# Patient Record
Sex: MALE | Race: WHITE | NOT HISPANIC OR LATINO | URBAN - METROPOLITAN AREA
[De-identification: names, ages, dates, MRNs, and addresses within clinical notes are randomized per-mention and may not be internally consistent; named-entity substitution may affect disease eponyms.]

---

## 2018-07-31 ENCOUNTER — EMERGENCY (EMERGENCY)
Facility: HOSPITAL | Age: 73
LOS: 1 days | Discharge: HOME | End: 2018-07-31
Admitting: EMERGENCY MEDICINE
Payer: MEDICARE

## 2018-07-31 VITALS
HEART RATE: 57 BPM | RESPIRATION RATE: 18 BRPM | DIASTOLIC BLOOD PRESSURE: 83 MMHG | OXYGEN SATURATION: 100 % | WEIGHT: 199.96 LBS | TEMPERATURE: 98 F | SYSTOLIC BLOOD PRESSURE: 149 MMHG
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
## 2018-07-31 DIAGNOSIS — Z23 ENCOUNTER FOR IMMUNIZATION: ICD-10-CM

## 2018-07-31 DIAGNOSIS — S91.321A LACERATION WITH FOREIGN BODY, RIGHT FOOT, INITIAL ENCOUNTER: ICD-10-CM

## 2018-07-31 DIAGNOSIS — I10 ESSENTIAL (PRIMARY) HYPERTENSION: ICD-10-CM
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
## 2018-07-31 PROCEDURE — 73630 X-RAY EXAM OF FOOT: CPT

## 2018-07-31 PROCEDURE — 99283 EMERGENCY DEPT VISIT LOW MDM: CPT | Mod: 25

## 2018-07-31 PROCEDURE — 12001 RPR S/N/AX/GEN/TRNK 2.5CM/<: CPT

## 2018-07-31 PROCEDURE — 73630 X-RAY EXAM OF FOOT: CPT | Mod: 26,RT

## 2018-07-31 PROCEDURE — 90715 TDAP VACCINE 7 YRS/> IM: CPT

## 2018-07-31 PROCEDURE — 90471 IMMUNIZATION ADMIN: CPT

## 2018-07-31 RX ORDER — TETANUS TOXOID, REDUCED DIPHTHERIA TOXOID AND ACELLULAR PERTUSSIS VACCINE, ADSORBED 5; 2.5; 8; 8; 2.5 [IU]/.5ML; [IU]/.5ML; UG/.5ML; UG/.5ML; UG/.5ML
0.5 SUSPENSION INTRAMUSCULAR ONCE
Qty: 0 | Refills: 0 | Status: COMPLETED | OUTPATIENT
Start: 2018-07-31 | End: 2018-07-31

## 2018-07-31 RX ADMIN — TETANUS TOXOID, REDUCED DIPHTHERIA TOXOID AND ACELLULAR PERTUSSIS VACCINE, ADSORBED 0.5 MILLILITER(S): 5; 2.5; 8; 8; 2.5 SUSPENSION INTRAMUSCULAR at 12:51

## 2018-07-31 NOTE — ED ADULT NURSE NOTE - CHIEF COMPLAINT QUOTE
Patient complaining of laceration to right foot after glass vase fell ontop of it.  Bleeding controlled last tetanus x 10 years ago.

## 2018-07-31 NOTE — ED ADULT NURSE NOTE - OBJECTIVE STATEMENT
Pt presents to ED c/o R foot laceration s/p dropping a vase on top of it PTA, pt with 2 cm laceration. Pt denies pain, bleeding well controlled by EMS. Tetanus not UTD. Pt presents in NAD speaking full sentences via EMS stretcher through triage.

## 2018-07-31 NOTE — ED PROVIDER NOTE - PHYSICAL EXAMINATION
2 cm laceration over distal 5th metatarsal region. FROM to all toes. sensation intact. cap refill < 2secs. mild active bleeding. + 2 abrasions to lateral right foot. no active bleeding.

## 2018-07-31 NOTE — ED PROVIDER NOTE - MEDICAL DECISION MAKING DETAILS
laceration to foot. neurovascular intact. no evidence of tendon injury on exam. no fb visibile. x-ray no acute pathology. lac repaired. td updated. wound care d/w pt. suture removal in 7 days.

## 2018-07-31 NOTE — ED PROVIDER NOTE - OBJECTIVE STATEMENT
71 y/o male with hx of HTN c/o lac to right foot. pt states vase fell from 4 ft and broke on his foot. pt notes bleeding and pain to right foot. pt able to bear wt. no numbness or tinlging. pt does not recall last Td. no blood thinner use. no further complaints.

## 2018-07-31 NOTE — ED ADULT NURSE NOTE - INTERVENTIONS DEFINITIONS
Non-slip footwear when patient is off stretcher/Provide visual cue, wrist band, yellow gown, etc./Stretcher in lowest position, wheels locked, appropriate side rails in place

## 2018-08-07 ENCOUNTER — EMERGENCY (EMERGENCY)
Facility: HOSPITAL | Age: 73
LOS: 1 days | Discharge: HOME | End: 2018-08-07
Admitting: EMERGENCY MEDICINE
Payer: MEDICARE

## 2018-08-07 VITALS
HEART RATE: 60 BPM | OXYGEN SATURATION: 97 % | TEMPERATURE: 98 F | SYSTOLIC BLOOD PRESSURE: 119 MMHG | WEIGHT: 199.96 LBS | RESPIRATION RATE: 16 BRPM | DIASTOLIC BLOOD PRESSURE: 76 MMHG

## 2018-08-07 DIAGNOSIS — I10 ESSENTIAL (PRIMARY) HYPERTENSION: ICD-10-CM

## 2018-08-07 DIAGNOSIS — S91.311D LACERATION WITHOUT FOREIGN BODY, RIGHT FOOT, SUBSEQUENT ENCOUNTER: ICD-10-CM
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
## 2018-08-07 PROCEDURE — 99212 OFFICE O/P EST SF 10 MIN: CPT

## 2018-08-07 NOTE — ED PROVIDER NOTE - OBJECTIVE STATEMENT
suture removal 72 year old male returns to ER for suture removal after having them placed here last week for laceration to right foot.  Denies any fever, chills, wound opening, drainage, redness or pain.

## 2018-08-07 NOTE — ED ADULT NURSE NOTE - OBJECTIVE STATEMENT
72y M, A&ox3, presents to ed for suture removals from right foot after vase of glass broke on foot over a week ago as per pt. Noted 3 sutures. No fever nor chills. Will continue to monitor.

## 2018-08-07 NOTE — ED PROVIDER NOTE - MEDICAL DECISION MAKING DETAILS
3 sutures removed 72 year old male returns to ER for suture removal after having them placed here last week for laceration to right foot. Sutures removed. Incision C/D/I. No indication for abx.  I have discussed the discharge plan with the patient. The patient agrees with the plan, as discussed.  The patient understands Emergency Department diagnosis is a preliminary diagnosis often based on limited information and that the patient must adhere to the follow-up plan as discussed.  The patient understands that if the symptoms worsen, the patient may return to the Emergency Department at any time for further evaluation and treatment.

## 2018-08-07 NOTE — ED PROVIDER NOTE - CARE PLAN
Principal Discharge DX:	Suture check  Assessment and plan of treatment:	s/p suture placement for dorsum r foot lac, 3 sutures removed

## 2021-05-12 NOTE — ED ADULT TRIAGE NOTE - CHIEF COMPLAINT QUOTE
Patient complaining of laceration to right foot after glass vase fell ontop of it.  Bleeding controlled last tetanus x 10 years ago. Vermilion Border Text: The closure involved the vermilion border.
